# Patient Record
Sex: FEMALE | Race: WHITE | NOT HISPANIC OR LATINO | URBAN - METROPOLITAN AREA
[De-identification: names, ages, dates, MRNs, and addresses within clinical notes are randomized per-mention and may not be internally consistent; named-entity substitution may affect disease eponyms.]

---

## 2019-01-29 ENCOUNTER — IMMUNIZATION (OUTPATIENT)
Dept: PHARMACY | Facility: CLINIC | Age: 24
End: 2019-01-29

## 2019-01-29 ENCOUNTER — OFFICE VISIT (OUTPATIENT)
Dept: INTERNAL MEDICINE | Facility: CLINIC | Age: 24
End: 2019-01-29
Payer: COMMERCIAL

## 2019-01-29 ENCOUNTER — IMMUNIZATION (OUTPATIENT)
Dept: PHARMACY | Facility: CLINIC | Age: 24
End: 2019-01-29
Payer: COMMERCIAL

## 2019-01-29 VITALS
HEART RATE: 84 BPM | HEIGHT: 62 IN | DIASTOLIC BLOOD PRESSURE: 80 MMHG | WEIGHT: 249.13 LBS | SYSTOLIC BLOOD PRESSURE: 124 MMHG | OXYGEN SATURATION: 98 % | BODY MASS INDEX: 45.84 KG/M2

## 2019-01-29 DIAGNOSIS — Z76.89 ESTABLISHING CARE WITH NEW DOCTOR, ENCOUNTER FOR: Primary | ICD-10-CM

## 2019-01-29 PROCEDURE — 99999 PR PBB SHADOW E&M-NEW PATIENT-LVL IV: CPT | Mod: PBBFAC,,, | Performed by: INTERNAL MEDICINE

## 2019-01-29 PROCEDURE — 3008F BODY MASS INDEX DOCD: CPT | Mod: CPTII,S$GLB,, | Performed by: INTERNAL MEDICINE

## 2019-01-29 PROCEDURE — 3008F PR BODY MASS INDEX (BMI) DOCUMENTED: ICD-10-PCS | Mod: CPTII,S$GLB,, | Performed by: INTERNAL MEDICINE

## 2019-01-29 PROCEDURE — 99204 OFFICE O/P NEW MOD 45 MIN: CPT | Mod: S$GLB,,, | Performed by: INTERNAL MEDICINE

## 2019-01-29 PROCEDURE — 87491 CHLMYD TRACH DNA AMP PROBE: CPT

## 2019-01-29 PROCEDURE — 99999 PR PBB SHADOW E&M-NEW PATIENT-LVL IV: ICD-10-PCS | Mod: PBBFAC,,, | Performed by: INTERNAL MEDICINE

## 2019-01-29 PROCEDURE — 99204 PR OFFICE/OUTPT VISIT, NEW, LEVL IV, 45-59 MIN: ICD-10-PCS | Mod: S$GLB,,, | Performed by: INTERNAL MEDICINE

## 2019-01-29 NOTE — PROGRESS NOTES
INTER[facility]    Initial Visit to Establish Care    PRESENTING HISTORY     Previous PCP: Stan Wheeler Iii, MD  Chief Complaint/Reason for Visit:     Chief Complaint   Patient presents with    Annual Exam     History of Present Illness & ROS : Ms. Kiera Gallego is a 23 y.o. female.      She works at [employer].  No complaints today.  Review of Systems   Constitutional: Negative for chills and fever.   HENT: Negative for congestion.    Eyes: Negative for blurred vision (She uses glasses) and double vision.   Respiratory: Negative for cough and shortness of breath.    Cardiovascular: Negative for chest pain, palpitations and leg swelling.   Gastrointestinal: Negative for blood in stool, heartburn, nausea and vomiting.   Genitourinary: Negative for dysuria, frequency and urgency.   Musculoskeletal: Negative for back pain and joint pain.   Skin: Negative for rash.   Neurological: Positive for headaches (3-4 times per week. Take BC powder). Negative for dizziness.   Psychiatric/Behavioral: Negative for depression. The patient is not nervous/anxious.        PAST HISTORY:     History reviewed. No pertinent past medical history.    History reviewed. No pertinent surgical history.    Family History   Problem Relation Age of Onset    Prostate cancer Paternal Grandfather     Brain cancer Maternal Grandmother     Diabetes Father     Hypertension Father     Abnormal EKG Father     Eclampsia Neg Hx        Social History     Socioeconomic History    Marital status: Single     Spouse name: None    Number of children: None    Years of education: None    Highest education level: None   Social Needs    Financial resource strain: None    Food insecurity - worry: None    Food insecurity - inability: None    Transportation needs - medical: None    Transportation needs - non-medical: None   Occupational History    None   Tobacco Use    Smoking status: Never Smoker    Smokeless tobacco: Never  Used   Substance and Sexual Activity    Alcohol use: Yes     Comment: very rare    Drug use: No    Sexual activity: No     Partners: Male     Birth control/protection: None   Other Topics Concern    None   Social History Narrative    PMH:depression, weight issues    FH: HTN, heart ds- PGF in late 70's, DM in uncle with obesity    SH: lives with -parents and sibs        She works at [employer].       MEDICATIONS & ALLERGIES:     Current Outpatient Medications on File Prior to Visit   Medication Sig Dispense Refill    [DISCONTINUED] drospirenone-ethinyl estradiol (OCELLA) 3-0.03 mg per tablet Take 1 tablet by mouth once daily. 30 tablet 11     No current facility-administered medications on file prior to visit.         Review of patient's allergies indicates:  No Known Allergies    Medications Reconciliation:   I have reconciled the patient's home medications with the patient/family. I have updated all changes.  Refer to After-Visit Medication List.    OBJECTIVE:     Vital Signs:  Vitals:    01/29/19 0812   BP: 124/80   Pulse: 84     Wt Readings from Last 1 Encounters:   01/29/19 0812 113 kg (249 lb 1.9 oz)     Body mass index is 45.56 kg/m².     Physical Exam:  General: Well developed, well nourished. No distress.  HEENT: Head is normocephalic, atraumatic; ears are normal.    Eyes: Clear conjunctiva.  Neck: Supple, symmetrical neck; trachea midline.  Lungs: Clear to auscultation bilaterally and normal respiratory effort.  Cardiovascular: Heart with regular rate and rhythm.    Extremities: No LE edema. Pulses 2+ and symmetric.   Abdomen: Abdomen is soft, non-tender non-distended with normal bowel sounds.  Skin: Skin color, texture, turgor normal. No rashes.  Musculoskeletal: Normal gait.   Lymph Nodes: No cervical or supraclavicular adenopathy.  Neurologic: Normal strength and tone. No focal numbness or weakness.   Psychiatric: Normal affect. Alert.    Laboratory  Lab Results   Component Value Date     WBC 11.58 05/14/2014    HGB 10.8 (L) 05/14/2014    HCT 36.4 (L) 05/14/2014     05/14/2014    ALT 30 10/02/2008    AST 24 10/02/2008     10/02/2008    K 3.3 (L) 10/02/2008     10/02/2008    CREATININE 0.8 10/02/2008    BUN 10 10/02/2008    CO2 26 10/02/2008    TSH 2.056 05/14/2014    INR 1.1 10/02/2008     ASSESSMENT & PLAN:   New patient who has not seen Primary Care Provider at [facility] for the past 3 years.  Patient is new to me. Medical, surgical, social, medication and allergy histories were obtained during this visit.    Establishing care with new doctor, encounter for  - Reviewed history and goals of care.  -     Ambulatory consult to Gynecology  -     Lipid panel; Future; Expected date: 01/29/2019  -     CBC auto differential; Future; Expected date: 01/29/2019  -     Comprehensive metabolic panel; Future; Expected date: 01/29/2019  -     HIV 1/2 Ag/Ab (4th Gen); Future; Expected date: 01/29/2019  -     C. trachomatis/N. gonorrhoeae by AMP DNA  -     Hemoglobin A1c; Future; Expected date: 01/29/2019  -     TSH; Future; Expected date: 01/29/2019    BMI 45.0-49.9, adult  -     Ambulatory Referral to Weight Management Program      Preventive Health Maintenance:  Printed Rx for Tdap, HPV series, Flu given to the patient.    Return to Clinic for Follow Up with me:   3 years.    Scheduled Follow-up :  Future Appointments   Date Time Provider Department Center   1/29/2019  8:40 AM LAB, APPOINTMENT [facility] LAB IM [facility]       After Visit Medication List :     Medication List           Accurate as of 1/29/19  8:34 AM. If you have any questions, ask your nurse or doctor.               STOP taking these medications    drospirenone-ethinyl estradiol 3-0.03 mg per tablet  Commonly known as:  OCELLA  Stopped by:  Darshan Levin MD            Signing Physician:  Darshan Levin MD

## 2019-01-31 LAB
C TRACH DNA SPEC QL NAA+PROBE: NOT DETECTED
N GONORRHOEA DNA SPEC QL NAA+PROBE: NOT DETECTED